# Patient Record
Sex: MALE | URBAN - METROPOLITAN AREA
[De-identification: names, ages, dates, MRNs, and addresses within clinical notes are randomized per-mention and may not be internally consistent; named-entity substitution may affect disease eponyms.]

---

## 2017-03-09 ENCOUNTER — TRANSFERRED RECORDS (OUTPATIENT)
Dept: HEALTH INFORMATION MANAGEMENT | Facility: CLINIC | Age: 47
End: 2017-03-09

## 2017-03-09 ENCOUNTER — MEDICAL CORRESPONDENCE (OUTPATIENT)
Dept: HEALTH INFORMATION MANAGEMENT | Facility: CLINIC | Age: 47
End: 2017-03-09

## 2017-03-31 DIAGNOSIS — H53.10 SUBJECTIVE VISUAL DISTURBANCE: Primary | ICD-10-CM

## 2017-06-19 ENCOUNTER — MEDICAL CORRESPONDENCE (OUTPATIENT)
Dept: HEALTH INFORMATION MANAGEMENT | Facility: CLINIC | Age: 47
End: 2017-06-19

## 2017-08-14 ENCOUNTER — OFFICE VISIT (OUTPATIENT)
Dept: UROLOGY | Facility: CLINIC | Age: 47
End: 2017-08-14

## 2017-08-14 VITALS
HEIGHT: 74 IN | BODY MASS INDEX: 40.43 KG/M2 | HEART RATE: 60 BPM | WEIGHT: 315 LBS | SYSTOLIC BLOOD PRESSURE: 118 MMHG | DIASTOLIC BLOOD PRESSURE: 64 MMHG

## 2017-08-14 DIAGNOSIS — N52.01 ERECTILE DYSFUNCTION DUE TO ARTERIAL INSUFFICIENCY: Primary | ICD-10-CM

## 2017-08-14 PROCEDURE — 99203 OFFICE O/P NEW LOW 30 MIN: CPT | Performed by: UROLOGY

## 2017-08-14 RX ORDER — POLYETHYLENE GLYCOL 3350 17 G/17G
POWDER, FOR SOLUTION ORAL
COMMUNITY
Start: 2017-04-07

## 2017-08-14 RX ORDER — AMOXICILLIN 250 MG
1-4 CAPSULE ORAL
COMMUNITY
Start: 2017-04-06

## 2017-08-14 RX ORDER — NAPROXEN 500 MG/1
TABLET ORAL
COMMUNITY
Start: 2017-05-24

## 2017-08-14 RX ORDER — ESCITALOPRAM OXALATE 20 MG/1
TABLET ORAL
COMMUNITY
Start: 2017-06-19

## 2017-08-14 RX ORDER — CYCLOBENZAPRINE HCL 10 MG
TABLET ORAL
COMMUNITY
Start: 2017-05-24

## 2017-08-14 RX ORDER — ONDANSETRON 8 MG/1
TABLET, FILM COATED ORAL
COMMUNITY
Start: 2017-03-30

## 2017-08-14 RX ORDER — TAMSULOSIN HYDROCHLORIDE 0.4 MG/1
0.4 CAPSULE ORAL
COMMUNITY
Start: 2017-06-08

## 2017-08-14 RX ORDER — SILDENAFIL 50 MG/1
25-50 TABLET, FILM COATED ORAL DAILY PRN
Qty: 6 TABLET | Refills: 1 | Status: SHIPPED | OUTPATIENT
Start: 2017-08-14 | End: 2017-08-30

## 2017-08-14 ASSESSMENT — PAIN SCALES - GENERAL: PAINLEVEL: NO PAIN (0)

## 2017-08-14 NOTE — PROGRESS NOTES
Chief Complaint:   Erectile Dysfunction         Consult or Referral:     Mr. Memo Waterman is a 47 year old male seen in consultation from Dr. Mcdaniel.         History of Present Illness:    Memo Waterman is a very pleasant 47 year old male who presents with a history of erectile dysfunction. Patient notes a fall at work in December 2016 with concussion and reported TBI. He states that since that time, has had increasing difficulty with erections. He is occasionally able to obtain short-lived erections but has early detumescence. Had on good erection recently but was unable to have ejaculation per his report. Patient states erectile function was good prior to injury. States he tried Cialis once in the remote past with good results, but that was many years ago.  Of note, patient has also recently started lexapro.    No significant urinary symptoms but states he intermittently takes tamsulosin to help him void.           Past Medical History:   Depression - started Lexapro in March 2017  BPH  GERD  Fall with TBI  Narcolepsy and severe sleep apnea  Obesity         Past Surgical History:     Past Surgical History:   Procedure Laterality Date     HERNIA REPAIR     Neck surgery  Cholecystectomy  L1 diskectomy  Cervical spine fusion         Medications     Current Outpatient Prescriptions   Medication     cyclobenzaprine (FLEXERIL) 10 MG tablet     Dextromethorphan-Guaifenesin (BENYLIN-DME OR)     escitalopram (LEXAPRO) 20 MG tablet     naproxen (NAPROSYN) 500 MG tablet     ondansetron (ZOFRAN) 8 MG tablet     polyethylene glycol (MIRALAX/GLYCOLAX) powder     ranitidine (ZANTAC) 150 MG tablet     senna-docusate (SENOKOT-S;PERICOLACE) 8.6-50 MG per tablet     tamsulosin (FLOMAX) 0.4 MG capsule     No current facility-administered medications for this visit.             Family History:   No know  malignancy         Social History:     Social History     Social History     Marital status: Single     Spouse name: N/A      "Number of children: N/A     Years of education: N/A     Occupational History     Not on file.     Social History Main Topics     Smoking status: Never Smoker     Smokeless tobacco: Never Used     Alcohol use Not on file     Drug use: Not on file     Sexual activity: Not on file     Other Topics Concern     Not on file     Social History Narrative     No narrative on file   Still on Workman's compensation.         Allergies:   Review of patient's allergies indicates no known allergies.         Review of Systems:  From intake questionnaire     Negative 14 system review except as noted on HPI, nurse's note.         Physical Exam:     Patient is a 47 year old  male   Vitals: Blood pressure 118/64, pulse 60, height 1.88 m (6' 2\"), weight (!) 164.7 kg (363 lb).  General Appearance Adult: Body mass index is 46.61 kg/(m^2)., Alert, no acute distress, oriented  HENT: throat/mouth:normal, good dentition  Neck: No adenopathy,masses or thyromegaly  Lungs: no respiratory distress, or pursed lip breathing  Heart: No obvious jugular venous distension present  Abdomen: obese, soft, nontender, no organomegaly or masses,   Musculoskeltal: extremities normal, no peripheral edema  Skin: no suspicious lesions or rashes  Neuro: Alert, oriented, speech and mentation normal  Psych: affect and mood normal  Gait: Normal  : penis, scrotum, testes normal         Assessment and Plan:     Assessment: 47 year old male with erectile dysfunction. We discussed possible etiologies including vascular/blood flow changes, neurologic changes, medications (Lexapro as possibility), etc. Also discussed treatment options including PDE-5 inhibitors, injections, and prostheses, etc. At this point, true etiology is unclear, but reasonable to trial sildenafil. Discussed risks/benefits/side effects of medication, including potential danger with mixing this with tamsulosin. He expressed understanding and would like a prescription.    Plan:  - Prescription for " sildenafil  - Follow-up 6 months for symptom review    Garth Nagy MD  Urology  [facility]  Pager [phone]

## 2017-08-14 NOTE — MR AVS SNAPSHOT
"              After Visit Summary   2017    Memo Waterman    MRN: [id number]           Patient Information     Date Of Birth          1970        Visit Information        Provider Department      2017 3:00 PM Garth Nagy MD [facility] Urology [facility]        Today's Diagnoses     Erectile dysfunction due to arterial insufficiency    -  1       Follow-ups after your visit        Follow-up notes from your care team     Return in about 6 months (around 2018).      Who to contact     If you have questions or need follow up information about today's clinic visit or your schedule please contact [facility] UROLOGY [facility] directly at [phone].  Normal or non-critical lab and imaging results will be communicated to you by MyChart, letter or phone within 4 business days after the clinic has received the results. If you do not hear from us within 7 days, please contact the clinic through [employer] or phone. If you have a critical or abnormal lab result, we will notify you by phone as soon as possible.  Submit refill requests through [employer] or call your pharmacy and they will forward the refill request to us. Please allow 3 business days for your refill to be completed.          Additional Information About Your Visit        MyChart Information     [employer] lets you send messages to your doctor, view your test results, renew your prescriptions, schedule appointments and more. To sign up, go to [email or web address] . Click on \"Log in\" on the left side of the screen, which will take you to the Welcome page. Then click on \"Sign up Now\" on the right side of the page.     You will be asked to enter the access code listed below, as well as some personal information. Please follow the directions to create your username and password.     Your access code is: [email or web address]  Expires: 2017 11:32 AM     Your access code will  in 90 days. " "If you need help or a new code, please call your [facility] clinic or [phone].        Care EveryWhere ID     This is your Care EveryWhere ID. This could be used by other organizations to access your [facility] medical records  [id number]        Your Vitals Were     Pulse Height BMI (Body Mass Index)             60 1.88 m (6' 2\") 46.61 kg/m2          Blood Pressure from Last 3 Encounters:   08/14/17 118/64    Weight from Last 3 Encounters:   08/14/17 (!) 164.7 kg (363 lb)              Today, you had the following     No orders found for display         Today's Medication Changes          These changes are accurate as of: 8/14/17 11:59 PM.  If you have any questions, ask your nurse or doctor.               Start taking these medicines.        Dose/Directions    sildenafil 50 MG cap/tab   Commonly known as:  REVATIO/VIAGRA   Used for:  Erectile dysfunction due to arterial insufficiency   Started by:  Garth Nagy MD        Dose:  25-50 mg   Take 0.5-1 tablets (25-50 mg) by mouth daily as needed for erectile dysfunction Take 30 min to 4 hours before intercourse.  Never use with nitroglycerin, terazosin or doxazosin.   Quantity:  6 tablet   Refills:  1            Where to get your medicines      Some of these will need a paper prescription and others can be bought over the counter.  Ask your nurse if you have questions.     Bring a paper prescription for each of these medications     sildenafil 50 MG cap/tab                Primary Care Provider    No Ref-Primary Verified       No address on file        Equal Access to Services     [facility] CARMEN AH: Magalys Loyola, waaxda ludash, qaybta kaalmada efrain staton. So [facility] [phone].    ATENCIÓN: Si habla español, tiene a reed disposición servicios gratuitos de asistencia lingüística. Llame al [phone].    We comply with applicable federal civil rights laws and [place] laws. We do not discriminate " on the basis of race, color, national origin, age, disability sex, sexual orientation or gender identity.            Thank you!     Thank you for choosing [facility]  for your care. Our goal is always to provide you with excellent care. Hearing back from our patients is one way we can continue to improve our services. Please take a few minutes to complete the written survey that you may receive in the mail after your visit with us. Thank you!             Your Updated Medication List - Protect others around you: Learn how to safely use, store and throw away your medicines at www.disposemymeds.org.          This list is accurate as of: 8/14/17 11:59 PM.  Always use your most recent med list.                   Brand Name Dispense Instructions for use Diagnosis    BENYLIN-DME OR      Long sponge and long shoe horn        cyclobenzaprine 10 MG tablet    FLEXERIL     Take 1 tablet by mouth every 8 hours as needed for upper spasm.        escitalopram 20 MG tablet    LEXAPRO     Take ? tablet orally once daily for 1 week, then 1 tablet once daily thereafter.        naproxen 500 MG tablet    NAPROSYN          ondansetron 8 MG tablet    ZOFRAN     Take 1 tablet orally every 8 hours if needed for nausea.        polyethylene glycol powder    MIRALAX/GLYCOLAX          ranitidine 150 MG tablet    ZANTAC     Take 1 to 2 tablets by mouth every 12 hours as needed.        senna-docusate 8.6-50 MG per tablet    SENOKOT-S;PERICOLACE     Take 1-4 tablets by mouth        sildenafil 50 MG cap/tab    REVATIO/VIAGRA    6 tablet    Take 0.5-1 tablets (25-50 mg) by mouth daily as needed for erectile dysfunction Take 30 min to 4 hours before intercourse.  Never use with nitroglycerin, terazosin or doxazosin.    Erectile dysfunction due to arterial insufficiency       tamsulosin 0.4 MG capsule    FLOMAX     Take 0.4 mg by mouth

## 2017-08-14 NOTE — LETTER
8/14/2017       RE: Memo Waterman  [address]  [address]  [place] [address]     Dear Colleague,    Thank you for referring your patient, Memo Waterman, to the [facility] at [facility]. Please see a copy of my visit note below.          Chief Complaint:   Erectile Dysfunction         Consult or Referral:     Mr. Memo Waterman is a 47 year old male seen in consultation from Dr. Mcdaniel.         History of Present Illness:    Memo Waterman is a very pleasant 47 year old male who presents with a history of erectile dysfunction. Patient notes a fall at work in December 2016 with concussion and reported TBI. He states that since that time, has had increasing difficulty with erections. He is occasionally able to obtain short-lived erections but has early detumescence. Had on good erection recently but was unable to have ejaculation per his report. Patient states erectile function was good prior to injury. States he tried Cialis once in the remote past with good results, but that was many years ago.  Of note, patient has also recently started lexapro.    No significant urinary symptoms but states he intermittently takes tamsulosin to help him void.           Past Medical History:   Depression - started Lexapro in March 2017  BPH  GERD  Fall with TBI  Narcolepsy and severe sleep apnea  Obesity         Past Surgical History:     Past Surgical History:   Procedure Laterality Date     HERNIA REPAIR     Neck surgery  Cholecystectomy  L1 diskectomy  Cervical spine fusion         Medications     Current Outpatient Prescriptions   Medication     cyclobenzaprine (FLEXERIL) 10 MG tablet     Dextromethorphan-Guaifenesin (BENYLIN-DME OR)     escitalopram (LEXAPRO) 20 MG tablet     naproxen (NAPROSYN) 500 MG tablet     ondansetron (ZOFRAN) 8 MG tablet     polyethylene glycol (MIRALAX/GLYCOLAX) powder     ranitidine (ZANTAC) 150 MG tablet      "senna-docusate (SENOKOT-S;PERICOLACE) 8.6-50 MG per tablet     tamsulosin (FLOMAX) 0.4 MG capsule     No current facility-administered medications for this visit.             Family History:   No know  malignancy         Social History:     Social History     Social History     Marital status: Single     Spouse name: N/A     Number of children: N/A     Years of education: N/A     Occupational History     Not on file.     Social History Main Topics     Smoking status: Never Smoker     Smokeless tobacco: Never Used     Alcohol use Not on file     Drug use: Not on file     Sexual activity: Not on file     Other Topics Concern     Not on file     Social History Narrative     No narrative on file   Still on Workman's compensation.         Allergies:   Review of patient's allergies indicates no known allergies.         Review of Systems:  From intake questionnaire     Negative 14 system review except as noted on HPI, nurse's note.         Physical Exam:     Patient is a 47 year old  male   Vitals: Blood pressure 118/64, pulse 60, height 1.88 m (6' 2\"), weight (!) 164.7 kg (363 lb).  General Appearance Adult: Body mass index is 46.61 kg/(m^2)., Alert, no acute distress, oriented  HENT: throat/mouth:normal, good dentition  Neck: No adenopathy,masses or thyromegaly  Lungs: no respiratory distress, or pursed lip breathing  Heart: No obvious jugular venous distension present  Abdomen: obese, soft, nontender, no organomegaly or masses,   Musculoskeltal: extremities normal, no peripheral edema  Skin: no suspicious lesions or rashes  Neuro: Alert, oriented, speech and mentation normal  Psych: affect and mood normal  Gait: Normal  : penis, scrotum, testes normal         Assessment and Plan:     Assessment: 47 year old male with erectile dysfunction. We discussed possible etiologies including vascular/blood flow changes, neurologic changes, medications (Lexapro as possibility), etc. Also discussed treatment options including " PDE-5 inhibitors, injections, and prostheses, etc. At this point, true etiology is unclear, but reasonable to trial sildenafil. Discussed risks/benefits/side effects of medication, including potential danger with mixing this with tamsulosin. He expressed understanding and would like a prescription.    Plan:  - Prescription for sildenafil  - Follow-up 6 months for symptom review    Garth Nagy MD  Urology  [facility]  Pager [phone]

## 2017-08-30 ENCOUNTER — TELEPHONE (OUTPATIENT)
Dept: UROLOGY | Facility: CLINIC | Age: 47
End: 2017-08-30

## 2017-08-30 DIAGNOSIS — N52.01 ERECTILE DYSFUNCTION DUE TO ARTERIAL INSUFFICIENCY: Primary | ICD-10-CM

## 2017-08-30 RX ORDER — SILDENAFIL CITRATE 20 MG/1
TABLET ORAL
Qty: 30 TABLET | Refills: 0 | Status: SHIPPED | OUTPATIENT
Start: 2017-08-30

## 2017-08-30 NOTE — TELEPHONE ENCOUNTER
Pt's pharmacy calling. They are requesting a new order for sildenafil for the 20mg tablets. 20mg would be more affordable for the patient. They just need a new Rx with dosing orders to fill it. Marianela Samano,A